# Patient Record
Sex: FEMALE
[De-identification: names, ages, dates, MRNs, and addresses within clinical notes are randomized per-mention and may not be internally consistent; named-entity substitution may affect disease eponyms.]

---

## 2020-10-16 ENCOUNTER — DASHBOARD ENCOUNTERS (OUTPATIENT)
Age: 75
End: 2020-10-16

## 2020-10-16 ENCOUNTER — LAB OUTSIDE AN ENCOUNTER (OUTPATIENT)
Dept: URBAN - METROPOLITAN AREA TELEHEALTH 2 | Facility: TELEHEALTH | Age: 75
End: 2020-10-16

## 2020-10-16 ENCOUNTER — OFFICE VISIT (OUTPATIENT)
Dept: URBAN - METROPOLITAN AREA TELEHEALTH 2 | Facility: TELEHEALTH | Age: 75
End: 2020-10-16
Payer: MEDICARE

## 2020-10-16 DIAGNOSIS — R13.10 DYSPHAGIA: ICD-10-CM

## 2020-10-16 PROCEDURE — G8418 CALC BMI BLW LOW PARAM F/U: HCPCS | Performed by: INTERNAL MEDICINE

## 2020-10-16 PROCEDURE — 99204 OFFICE O/P NEW MOD 45 MIN: CPT | Performed by: INTERNAL MEDICINE

## 2020-10-16 PROCEDURE — G8482 FLU IMMUNIZE ORDER/ADMIN: HCPCS | Performed by: INTERNAL MEDICINE

## 2020-10-16 PROCEDURE — 1036F TOBACCO NON-USER: CPT | Performed by: INTERNAL MEDICINE

## 2020-10-16 PROCEDURE — G9903 PT SCRN TBCO ID AS NON USER: HCPCS | Performed by: INTERNAL MEDICINE

## 2020-10-16 PROCEDURE — G8427 DOCREV CUR MEDS BY ELIG CLIN: HCPCS | Performed by: INTERNAL MEDICINE

## 2020-10-16 NOTE — HPI-TODAY'S VISIT:
daughter assisting with history Patient with dysphagia to solids and liquids for 2 months, worsening, no alleviating factors, went to ENT and had a study done (not sure if MBS or something else) and referred to GI. No hx GERD.

## 2020-10-26 PROBLEM — 40739000 DYSPHAGIA: Status: ACTIVE | Noted: 2020-10-16

## 2020-10-27 ENCOUNTER — OFFICE VISIT (OUTPATIENT)
Dept: URBAN - METROPOLITAN AREA SURGERY CENTER 23 | Facility: SURGERY CENTER | Age: 75
End: 2020-10-27

## 2020-11-03 ENCOUNTER — TELEPHONE ENCOUNTER (OUTPATIENT)
Dept: URBAN - METROPOLITAN AREA CLINIC 118 | Facility: CLINIC | Age: 75
End: 2020-11-03

## 2020-11-03 PROBLEM — 71457002 OROPHARYNGEAL DYSPHAGIA: Status: ACTIVE | Noted: 2020-11-03
